# Patient Record
Sex: FEMALE | Race: WHITE | NOT HISPANIC OR LATINO | Employment: OTHER | URBAN - NONMETROPOLITAN AREA
[De-identification: names, ages, dates, MRNs, and addresses within clinical notes are randomized per-mention and may not be internally consistent; named-entity substitution may affect disease eponyms.]

---

## 2020-01-01 ENCOUNTER — APPOINTMENT (OUTPATIENT)
Dept: GENERAL RADIOLOGY | Facility: HOSPITAL | Age: 85
End: 2020-01-01

## 2020-01-01 ENCOUNTER — LAB REQUISITION (OUTPATIENT)
Dept: LAB | Facility: HOSPITAL | Age: 85
End: 2020-01-01

## 2020-01-01 ENCOUNTER — OUTSIDE FACILITY SERVICE (OUTPATIENT)
Dept: CARDIOLOGY | Facility: CLINIC | Age: 85
End: 2020-01-01

## 2020-01-01 ENCOUNTER — HOSPITAL ENCOUNTER (INPATIENT)
Facility: HOSPITAL | Age: 85
LOS: 2 days | End: 2020-11-16
Attending: EMERGENCY MEDICINE | Admitting: INTERNAL MEDICINE

## 2020-01-01 VITALS
HEART RATE: 137 BPM | OXYGEN SATURATION: 87 % | BODY MASS INDEX: 29.87 KG/M2 | DIASTOLIC BLOOD PRESSURE: 60 MMHG | HEIGHT: 65 IN | TEMPERATURE: 98.4 F | SYSTOLIC BLOOD PRESSURE: 99 MMHG | WEIGHT: 179.3 LBS | RESPIRATION RATE: 10 BRPM

## 2020-01-01 DIAGNOSIS — Z00.00 ROUTINE GENERAL MEDICAL EXAMINATION AT A HEALTH CARE FACILITY: ICD-10-CM

## 2020-01-01 DIAGNOSIS — U07.1 PNEUMONIA DUE TO COVID-19 VIRUS: Primary | ICD-10-CM

## 2020-01-01 DIAGNOSIS — N39.0 ACUTE UTI (URINARY TRACT INFECTION): ICD-10-CM

## 2020-01-01 DIAGNOSIS — I48.91 ATRIAL FIBRILLATION WITH RVR (HCC): ICD-10-CM

## 2020-01-01 DIAGNOSIS — J12.82 PNEUMONIA DUE TO COVID-19 VIRUS: Primary | ICD-10-CM

## 2020-01-01 DIAGNOSIS — R09.02 HYPOXIC: ICD-10-CM

## 2020-01-01 LAB
ALBUMIN SERPL-MCNC: 3 G/DL (ref 3.5–5.2)
ALBUMIN SERPL-MCNC: 3.3 G/DL (ref 3.5–5.2)
ALBUMIN SERPL-MCNC: 3.3 G/DL (ref 3.5–5.2)
ALBUMIN/GLOB SERPL: 0.9 G/DL
ALBUMIN/GLOB SERPL: 1.2 G/DL
ALBUMIN/GLOB SERPL: 1.4 G/DL
ALP SERPL-CCNC: 162 U/L (ref 39–117)
ALP SERPL-CCNC: 179 U/L (ref 39–117)
ALP SERPL-CCNC: 222 U/L (ref 39–117)
ALT SERPL W P-5'-P-CCNC: 7 U/L (ref 1–33)
ALT SERPL W P-5'-P-CCNC: 8 U/L (ref 1–33)
ALT SERPL W P-5'-P-CCNC: 9 U/L (ref 1–33)
ANION GAP SERPL CALCULATED.3IONS-SCNC: 15 MMOL/L (ref 5–15)
ANION GAP SERPL CALCULATED.3IONS-SCNC: 16 MMOL/L (ref 5–15)
ANION GAP SERPL CALCULATED.3IONS-SCNC: 26 MMOL/L (ref 5–15)
ANISOCYTOSIS BLD QL: NORMAL
APTT PPP: 24.7 SECONDS (ref 24.1–35)
ARTERIAL PATENCY WRIST A: POSITIVE
AST SERPL-CCNC: 13 U/L (ref 1–32)
AST SERPL-CCNC: 17 U/L (ref 1–32)
AST SERPL-CCNC: 20 U/L (ref 1–32)
ATMOSPHERIC PRESS: 750 MMHG
BACTERIA SPEC AEROBE CULT: ABNORMAL
BACTERIA SPEC AEROBE CULT: ABNORMAL
BACTERIA UR QL AUTO: ABNORMAL /HPF
BACTERIA UR QL AUTO: ABNORMAL /HPF
BASE EXCESS BLDA CALC-SCNC: 10.5 MMOL/L (ref 0–2)
BASOPHILS # BLD AUTO: 0.01 10*3/MM3 (ref 0–0.2)
BASOPHILS # BLD AUTO: 0.03 10*3/MM3 (ref 0–0.2)
BASOPHILS # BLD AUTO: 0.05 10*3/MM3 (ref 0–0.2)
BASOPHILS NFR BLD AUTO: 0.1 % (ref 0–1.5)
BASOPHILS NFR BLD AUTO: 0.2 % (ref 0–1.5)
BASOPHILS NFR BLD AUTO: 0.6 % (ref 0–1.5)
BDY SITE: ABNORMAL
BILIRUB SERPL-MCNC: 0.3 MG/DL (ref 0–1.2)
BILIRUB SERPL-MCNC: 0.3 MG/DL (ref 0–1.2)
BILIRUB SERPL-MCNC: 0.4 MG/DL (ref 0–1.2)
BILIRUB UR QL STRIP: NEGATIVE
BILIRUB UR QL STRIP: NEGATIVE
BODY TEMPERATURE: 37 C
BUN SERPL-MCNC: 57 MG/DL (ref 8–23)
BUN SERPL-MCNC: 61 MG/DL (ref 8–23)
BUN SERPL-MCNC: 62 MG/DL (ref 8–23)
BUN/CREAT SERPL: 24.8 (ref 7–25)
BUN/CREAT SERPL: 29.2 (ref 7–25)
BUN/CREAT SERPL: 35.6 (ref 7–25)
CALCIUM SPEC-SCNC: 8.8 MG/DL (ref 8.2–9.6)
CALCIUM SPEC-SCNC: 9.1 MG/DL (ref 8.2–9.6)
CALCIUM SPEC-SCNC: 9.1 MG/DL (ref 8.2–9.6)
CHLORIDE SERPL-SCNC: 104 MMOL/L (ref 98–107)
CHLORIDE SERPL-SCNC: 86 MMOL/L (ref 98–107)
CHLORIDE SERPL-SCNC: 98 MMOL/L (ref 98–107)
CLARITY UR: ABNORMAL
CLARITY UR: ABNORMAL
CO2 SERPL-SCNC: 23 MMOL/L (ref 22–29)
CO2 SERPL-SCNC: 31 MMOL/L (ref 22–29)
CO2 SERPL-SCNC: 33 MMOL/L (ref 22–29)
COLOR UR: YELLOW
COLOR UR: YELLOW
CREAT SERPL-MCNC: 1.6 MG/DL (ref 0.57–1)
CREAT SERPL-MCNC: 2.12 MG/DL (ref 0.57–1)
CREAT SERPL-MCNC: 2.46 MG/DL (ref 0.57–1)
CRP SERPL-MCNC: 7.01 MG/DL (ref 0–0.5)
D DIMER PPP FEU-MCNC: 2.47 MG/L (FEU) (ref 0–0.5)
D-LACTATE SERPL-SCNC: 2 MMOL/L (ref 0.5–2)
D-LACTATE SERPL-SCNC: 2.1 MMOL/L (ref 0.5–2)
DEPRECATED RDW RBC AUTO: 56.5 FL (ref 37–54)
DEPRECATED RDW RBC AUTO: 57.1 FL (ref 37–54)
DEPRECATED RDW RBC AUTO: 58.4 FL (ref 37–54)
ELLIPTOCYTES BLD QL SMEAR: NORMAL
EOSINOPHIL # BLD AUTO: 0 10*3/MM3 (ref 0–0.4)
EOSINOPHIL # BLD AUTO: 0 10*3/MM3 (ref 0–0.4)
EOSINOPHIL # BLD AUTO: 0.08 10*3/MM3 (ref 0–0.4)
EOSINOPHIL NFR BLD AUTO: 0 % (ref 0.3–6.2)
EOSINOPHIL NFR BLD AUTO: 0 % (ref 0.3–6.2)
EOSINOPHIL NFR BLD AUTO: 1 % (ref 0.3–6.2)
ERYTHROCYTE [DISTWIDTH] IN BLOOD BY AUTOMATED COUNT: 22.3 % (ref 12.3–15.4)
ERYTHROCYTE [DISTWIDTH] IN BLOOD BY AUTOMATED COUNT: 22.6 % (ref 12.3–15.4)
ERYTHROCYTE [DISTWIDTH] IN BLOOD BY AUTOMATED COUNT: 22.8 % (ref 12.3–15.4)
FINE GRAN CASTS URNS QL MICRO: ABNORMAL /LPF
GFR SERPL CREATININE-BSD FRML MDRD: 18 ML/MIN/1.73
GFR SERPL CREATININE-BSD FRML MDRD: 22 ML/MIN/1.73
GFR SERPL CREATININE-BSD FRML MDRD: 30 ML/MIN/1.73
GLOBULIN UR ELPH-MCNC: 2.4 GM/DL
GLOBULIN UR ELPH-MCNC: 2.8 GM/DL
GLOBULIN UR ELPH-MCNC: 3.2 GM/DL
GLUCOSE SERPL-MCNC: 1043 MG/DL (ref 65–99)
GLUCOSE SERPL-MCNC: 424 MG/DL (ref 65–99)
GLUCOSE SERPL-MCNC: 447 MG/DL (ref 65–99)
GLUCOSE UR STRIP-MCNC: ABNORMAL MG/DL
GLUCOSE UR STRIP-MCNC: ABNORMAL MG/DL
HCO3 BLDA-SCNC: 35.7 MMOL/L (ref 20–26)
HCT VFR BLD AUTO: 42.7 % (ref 34–46.6)
HCT VFR BLD AUTO: 42.8 % (ref 34–46.6)
HCT VFR BLD AUTO: 45.2 % (ref 34–46.6)
HGB BLD-MCNC: 12.5 G/DL (ref 12–15.9)
HGB BLD-MCNC: 12.8 G/DL (ref 12–15.9)
HGB BLD-MCNC: 13.1 G/DL (ref 12–15.9)
HGB UR QL STRIP.AUTO: ABNORMAL
HGB UR QL STRIP.AUTO: ABNORMAL
HYALINE CASTS UR QL AUTO: ABNORMAL /LPF
HYALINE CASTS UR QL AUTO: ABNORMAL /LPF
INR PPP: 1.18 (ref 0.91–1.09)
KETONES UR QL STRIP: ABNORMAL
KETONES UR QL STRIP: NEGATIVE
LACTATE HOLD SPECIMEN: NORMAL
LEUKOCYTE ESTERASE UR QL STRIP.AUTO: ABNORMAL
LEUKOCYTE ESTERASE UR QL STRIP.AUTO: ABNORMAL
LYMPHOCYTES # BLD AUTO: 0.49 10*3/MM3 (ref 0.7–3.1)
LYMPHOCYTES # BLD AUTO: 1 10*3/MM3 (ref 0.7–3.1)
LYMPHOCYTES # BLD AUTO: 1.39 10*3/MM3 (ref 0.7–3.1)
LYMPHOCYTES NFR BLD AUTO: 16.7 % (ref 19.6–45.3)
LYMPHOCYTES NFR BLD AUTO: 4.5 % (ref 19.6–45.3)
LYMPHOCYTES NFR BLD AUTO: 8.1 % (ref 19.6–45.3)
Lab: ABNORMAL
MCH RBC QN AUTO: 22 PG (ref 26.6–33)
MCH RBC QN AUTO: 22.2 PG (ref 26.6–33)
MCH RBC QN AUTO: 22.4 PG (ref 26.6–33)
MCHC RBC AUTO-ENTMCNC: 29 G/DL (ref 31.5–35.7)
MCHC RBC AUTO-ENTMCNC: 29.2 G/DL (ref 31.5–35.7)
MCHC RBC AUTO-ENTMCNC: 30 G/DL (ref 31.5–35.7)
MCV RBC AUTO: 74.7 FL (ref 79–97)
MCV RBC AUTO: 75.8 FL (ref 79–97)
MCV RBC AUTO: 76 FL (ref 79–97)
MICROCYTES BLD QL: NORMAL
MODALITY: ABNORMAL
MONOCYTES # BLD AUTO: 0.42 10*3/MM3 (ref 0.1–0.9)
MONOCYTES # BLD AUTO: 0.58 10*3/MM3 (ref 0.1–0.9)
MONOCYTES # BLD AUTO: 1.05 10*3/MM3 (ref 0.1–0.9)
MONOCYTES NFR BLD AUTO: 12.6 % (ref 5–12)
MONOCYTES NFR BLD AUTO: 3.9 % (ref 5–12)
MONOCYTES NFR BLD AUTO: 4.7 % (ref 5–12)
NEUTROPHILS NFR BLD AUTO: 10.63 10*3/MM3 (ref 1.7–7)
NEUTROPHILS NFR BLD AUTO: 5.69 10*3/MM3 (ref 1.7–7)
NEUTROPHILS NFR BLD AUTO: 68.5 % (ref 42.7–76)
NEUTROPHILS NFR BLD AUTO: 86.8 % (ref 42.7–76)
NEUTROPHILS NFR BLD AUTO: 9.92 10*3/MM3 (ref 1.7–7)
NEUTROPHILS NFR BLD AUTO: 91.2 % (ref 42.7–76)
NITRITE UR QL STRIP: NEGATIVE
NITRITE UR QL STRIP: NEGATIVE
NT-PROBNP SERPL-MCNC: 4103 PG/ML (ref 0–1800)
PCO2 BLDA: 48.4 MM HG (ref 35–45)
PCO2 TEMP ADJ BLD: 48.4 MM HG (ref 35–45)
PH BLDA: 7.47 PH UNITS (ref 7.35–7.45)
PH UR STRIP.AUTO: 6 [PH] (ref 5–8)
PH UR STRIP.AUTO: <=5 [PH] (ref 5–8)
PH, TEMP CORRECTED: 7.47 PH UNITS (ref 7.35–7.45)
PLATELET # BLD AUTO: 291 10*3/MM3 (ref 140–450)
PLATELET # BLD AUTO: 353 10*3/MM3 (ref 140–450)
PLATELET # BLD AUTO: 385 10*3/MM3 (ref 140–450)
PMV BLD AUTO: 11.2 FL (ref 6–12)
PMV BLD AUTO: 11.6 FL (ref 6–12)
PO2 BLDA: 61.3 MM HG (ref 83–108)
PO2 TEMP ADJ BLD: 61.3 MM HG (ref 83–108)
POIKILOCYTOSIS BLD QL SMEAR: NORMAL
POTASSIUM SERPL-SCNC: 2.9 MMOL/L (ref 3.5–5.2)
POTASSIUM SERPL-SCNC: 3.1 MMOL/L (ref 3.5–5.2)
POTASSIUM SERPL-SCNC: 3.9 MMOL/L (ref 3.5–5.2)
PROCALCITONIN SERPL-MCNC: 0.81 NG/ML (ref 0–0.25)
PROT SERPL-MCNC: 5.7 G/DL (ref 6–8.5)
PROT SERPL-MCNC: 6.1 G/DL (ref 6–8.5)
PROT SERPL-MCNC: 6.2 G/DL (ref 6–8.5)
PROT UR QL STRIP: ABNORMAL
PROT UR QL STRIP: ABNORMAL
PROTHROMBIN TIME: 14.6 SECONDS (ref 11.9–14.6)
QT INTERVAL: 322 MS
QTC INTERVAL: 467 MS
RBC # BLD AUTO: 5.63 10*6/MM3 (ref 3.77–5.28)
RBC # BLD AUTO: 5.72 10*6/MM3 (ref 3.77–5.28)
RBC # BLD AUTO: 5.96 10*6/MM3 (ref 3.77–5.28)
RBC # UR: ABNORMAL /HPF
RBC # UR: ABNORMAL /HPF
REF LAB TEST METHOD: ABNORMAL
REF LAB TEST METHOD: ABNORMAL
SAO2 % BLDCOA: 92.4 % (ref 94–99)
SARS-COV-2 RNA PNL SPEC NAA+PROBE: NOT DETECTED
SMALL PLATELETS BLD QL SMEAR: ADEQUATE
SODIUM SERPL-SCNC: 135 MMOL/L (ref 136–145)
SODIUM SERPL-SCNC: 147 MMOL/L (ref 136–145)
SODIUM SERPL-SCNC: 150 MMOL/L (ref 136–145)
SP GR UR STRIP: 1.02 (ref 1–1.03)
SP GR UR STRIP: 1.02 (ref 1–1.03)
SQUAMOUS #/AREA URNS HPF: ABNORMAL /HPF
SQUAMOUS #/AREA URNS HPF: ABNORMAL /HPF
TROPONIN T SERPL-MCNC: 0.04 NG/ML (ref 0–0.03)
UROBILINOGEN UR QL STRIP: ABNORMAL
UROBILINOGEN UR QL STRIP: ABNORMAL
VENTILATOR MODE: ABNORMAL
WBC # BLD AUTO: 10.87 10*3/MM3 (ref 3.4–10.8)
WBC # BLD AUTO: 12.27 10*3/MM3 (ref 3.4–10.8)
WBC # BLD AUTO: 8.31 10*3/MM3 (ref 3.4–10.8)
WBC UR QL AUTO: ABNORMAL /HPF
WBC UR QL AUTO: ABNORMAL /HPF
YEAST URNS QL MICRO: ABNORMAL /HPF
YEAST URNS QL MICRO: ABNORMAL /HPF

## 2020-01-01 PROCEDURE — 25010000002 LORAZEPAM PER 2 MG: Performed by: FAMILY MEDICINE

## 2020-01-01 PROCEDURE — 83605 ASSAY OF LACTIC ACID: CPT | Performed by: EMERGENCY MEDICINE

## 2020-01-01 PROCEDURE — 85025 COMPLETE CBC W/AUTO DIFF WBC: CPT

## 2020-01-01 PROCEDURE — 99223 1ST HOSP IP/OBS HIGH 75: CPT | Performed by: CLINICAL NURSE SPECIALIST

## 2020-01-01 PROCEDURE — 36600 WITHDRAWAL OF ARTERIAL BLOOD: CPT

## 2020-01-01 PROCEDURE — 93010 ELECTROCARDIOGRAM REPORT: CPT | Performed by: INTERNAL MEDICINE

## 2020-01-01 PROCEDURE — 25010000002 MORPHINE (PF) 10 MG/ML SOLUTION: Performed by: FAMILY MEDICINE

## 2020-01-01 PROCEDURE — 85007 BL SMEAR W/DIFF WBC COUNT: CPT | Performed by: EMERGENCY MEDICINE

## 2020-01-01 PROCEDURE — 99285 EMERGENCY DEPT VISIT HI MDM: CPT

## 2020-01-01 PROCEDURE — 80053 COMPREHEN METABOLIC PANEL: CPT | Performed by: EMERGENCY MEDICINE

## 2020-01-01 PROCEDURE — 82803 BLOOD GASES ANY COMBINATION: CPT

## 2020-01-01 PROCEDURE — 80053 COMPREHEN METABOLIC PANEL: CPT

## 2020-01-01 PROCEDURE — 81001 URINALYSIS AUTO W/SCOPE: CPT

## 2020-01-01 PROCEDURE — 84145 PROCALCITONIN (PCT): CPT | Performed by: EMERGENCY MEDICINE

## 2020-01-01 PROCEDURE — 87086 URINE CULTURE/COLONY COUNT: CPT

## 2020-01-01 PROCEDURE — 84484 ASSAY OF TROPONIN QUANT: CPT | Performed by: EMERGENCY MEDICINE

## 2020-01-01 PROCEDURE — 87086 URINE CULTURE/COLONY COUNT: CPT | Performed by: EMERGENCY MEDICINE

## 2020-01-01 PROCEDURE — 85025 COMPLETE CBC W/AUTO DIFF WBC: CPT | Performed by: EMERGENCY MEDICINE

## 2020-01-01 PROCEDURE — 85610 PROTHROMBIN TIME: CPT | Performed by: EMERGENCY MEDICINE

## 2020-01-01 PROCEDURE — 86140 C-REACTIVE PROTEIN: CPT | Performed by: EMERGENCY MEDICINE

## 2020-01-01 PROCEDURE — 93005 ELECTROCARDIOGRAM TRACING: CPT | Performed by: EMERGENCY MEDICINE

## 2020-01-01 PROCEDURE — 25010000002 CEFTRIAXONE PER 250 MG: Performed by: EMERGENCY MEDICINE

## 2020-01-01 PROCEDURE — 25010000002 LORAZEPAM PER 2 MG: Performed by: CLINICAL NURSE SPECIALIST

## 2020-01-01 PROCEDURE — 25010000002 DEXAMETHASONE SODIUM PHOSPHATE 10 MG/ML SOLUTION: Performed by: EMERGENCY MEDICINE

## 2020-01-01 PROCEDURE — 81001 URINALYSIS AUTO W/SCOPE: CPT | Performed by: EMERGENCY MEDICINE

## 2020-01-01 PROCEDURE — P9612 CATHETERIZE FOR URINE SPEC: HCPCS

## 2020-01-01 PROCEDURE — 87635 SARS-COV-2 COVID-19 AMP PRB: CPT | Performed by: EMERGENCY MEDICINE

## 2020-01-01 PROCEDURE — 85730 THROMBOPLASTIN TIME PARTIAL: CPT | Performed by: EMERGENCY MEDICINE

## 2020-01-01 PROCEDURE — 83880 ASSAY OF NATRIURETIC PEPTIDE: CPT | Performed by: EMERGENCY MEDICINE

## 2020-01-01 PROCEDURE — 87040 BLOOD CULTURE FOR BACTERIA: CPT | Performed by: EMERGENCY MEDICINE

## 2020-01-01 PROCEDURE — 71045 X-RAY EXAM CHEST 1 VIEW: CPT

## 2020-01-01 PROCEDURE — 85379 FIBRIN DEGRADATION QUANT: CPT | Performed by: EMERGENCY MEDICINE

## 2020-01-01 RX ORDER — LORAZEPAM 2 MG/ML
2 CONCENTRATE ORAL
Status: DISCONTINUED | OUTPATIENT
Start: 2020-01-01 | End: 2020-11-17 | Stop reason: HOSPADM

## 2020-01-01 RX ORDER — DEXAMETHASONE SODIUM PHOSPHATE 10 MG/ML
10 INJECTION, SOLUTION INTRAMUSCULAR; INTRAVENOUS ONCE
Status: COMPLETED | OUTPATIENT
Start: 2020-01-01 | End: 2020-01-01

## 2020-01-01 RX ORDER — ACETAMINOPHEN 160 MG/5ML
650 SOLUTION ORAL EVERY 4 HOURS PRN
Status: DISCONTINUED | OUTPATIENT
Start: 2020-01-01 | End: 2020-11-17 | Stop reason: HOSPADM

## 2020-01-01 RX ORDER — LORAZEPAM 2 MG/ML
1 INJECTION INTRAMUSCULAR
Status: DISCONTINUED | OUTPATIENT
Start: 2020-01-01 | End: 2020-11-17 | Stop reason: HOSPADM

## 2020-01-01 RX ORDER — MORPHINE SULFATE 1 MG/ML
2-30 INJECTION, SOLUTION INTRAVENOUS
Status: DISCONTINUED | OUTPATIENT
Start: 2020-01-01 | End: 2020-11-17 | Stop reason: HOSPADM

## 2020-01-01 RX ORDER — LORAZEPAM 1 MG/1
2 TABLET ORAL
Status: DISCONTINUED | OUTPATIENT
Start: 2020-01-01 | End: 2020-11-17 | Stop reason: HOSPADM

## 2020-01-01 RX ORDER — DIPHENOXYLATE HYDROCHLORIDE AND ATROPINE SULFATE 2.5; .025 MG/1; MG/1
1 TABLET ORAL
Status: DISCONTINUED | OUTPATIENT
Start: 2020-01-01 | End: 2020-11-17 | Stop reason: HOSPADM

## 2020-01-01 RX ORDER — LORAZEPAM 1 MG/1
1 TABLET ORAL
Status: DISCONTINUED | OUTPATIENT
Start: 2020-01-01 | End: 2020-11-17 | Stop reason: HOSPADM

## 2020-01-01 RX ORDER — LORAZEPAM 2 MG/ML
1 CONCENTRATE ORAL
Status: DISCONTINUED | OUTPATIENT
Start: 2020-01-01 | End: 2020-11-17 | Stop reason: HOSPADM

## 2020-01-01 RX ORDER — ACETAMINOPHEN 650 MG/1
650 SUPPOSITORY RECTAL EVERY 4 HOURS PRN
Status: DISCONTINUED | OUTPATIENT
Start: 2020-01-01 | End: 2020-11-17 | Stop reason: HOSPADM

## 2020-01-01 RX ORDER — MORPHINE SULFATE 1 MG/ML
2 INJECTION, SOLUTION INTRAVENOUS CONTINUOUS
Status: DISCONTINUED | OUTPATIENT
Start: 2020-01-01 | End: 2020-01-01

## 2020-01-01 RX ORDER — SCOLOPAMINE TRANSDERMAL SYSTEM 1 MG/1
1 PATCH, EXTENDED RELEASE TRANSDERMAL
Status: DISCONTINUED | OUTPATIENT
Start: 2020-01-01 | End: 2020-11-17 | Stop reason: HOSPADM

## 2020-01-01 RX ORDER — ACETAMINOPHEN 325 MG/1
650 TABLET ORAL EVERY 4 HOURS PRN
Status: DISCONTINUED | OUTPATIENT
Start: 2020-01-01 | End: 2020-11-17 | Stop reason: HOSPADM

## 2020-01-01 RX ORDER — LORAZEPAM 2 MG/ML
0.5 CONCENTRATE ORAL
Status: DISCONTINUED | OUTPATIENT
Start: 2020-01-01 | End: 2020-11-17 | Stop reason: HOSPADM

## 2020-01-01 RX ORDER — SODIUM CHLORIDE 0.9 % (FLUSH) 0.9 %
10 SYRINGE (ML) INJECTION AS NEEDED
Status: DISCONTINUED | OUTPATIENT
Start: 2020-01-01 | End: 2020-11-17 | Stop reason: HOSPADM

## 2020-01-01 RX ORDER — LORAZEPAM 0.5 MG/1
0.5 TABLET ORAL
Status: DISCONTINUED | OUTPATIENT
Start: 2020-01-01 | End: 2020-11-17 | Stop reason: HOSPADM

## 2020-01-01 RX ORDER — LORAZEPAM 2 MG/ML
2 INJECTION INTRAMUSCULAR
Status: DISCONTINUED | OUTPATIENT
Start: 2020-01-01 | End: 2020-11-17 | Stop reason: HOSPADM

## 2020-01-01 RX ORDER — LORAZEPAM 2 MG/ML
0.5 INJECTION INTRAMUSCULAR
Status: DISCONTINUED | OUTPATIENT
Start: 2020-01-01 | End: 2020-11-17 | Stop reason: HOSPADM

## 2020-01-01 RX ORDER — LORAZEPAM 2 MG/ML
0.25 INJECTION INTRAMUSCULAR EVERY 6 HOURS
Status: DISCONTINUED | OUTPATIENT
Start: 2020-01-01 | End: 2020-11-17 | Stop reason: HOSPADM

## 2020-01-01 RX ADMIN — DEXAMETHASONE SODIUM PHOSPHATE 10 MG: 10 INJECTION, SOLUTION INTRAMUSCULAR; INTRAVENOUS at 11:49

## 2020-01-01 RX ADMIN — CEFTRIAXONE SODIUM 1 G: 1 INJECTION, POWDER, FOR SOLUTION INTRAMUSCULAR; INTRAVENOUS at 11:49

## 2020-01-01 RX ADMIN — LORAZEPAM 0.25 MG: 2 INJECTION INTRAMUSCULAR; INTRAVENOUS at 15:34

## 2020-01-01 RX ADMIN — MORPHINE SULFATE 4 MG/HR: 10 INJECTION, SOLUTION INTRAMUSCULAR; INTRAVENOUS at 00:13

## 2020-01-01 RX ADMIN — MORPHINE SULFATE 4 MG/HR: 10 INJECTION, SOLUTION INTRAMUSCULAR; INTRAVENOUS at 10:58

## 2020-01-01 RX ADMIN — MORPHINE SULFATE 4 MG/HR: 10 INJECTION, SOLUTION INTRAMUSCULAR; INTRAVENOUS at 17:07

## 2020-01-01 RX ADMIN — SCOPALAMINE 1 PATCH: 1 PATCH, EXTENDED RELEASE TRANSDERMAL at 15:34

## 2020-01-01 RX ADMIN — DILTIAZEM HYDROCHLORIDE 5 MG/HR: 5 INJECTION INTRAVENOUS at 12:39

## 2020-01-01 RX ADMIN — LORAZEPAM 0.5 MG: 2 INJECTION INTRAMUSCULAR; INTRAVENOUS at 10:34

## 2020-01-01 RX ADMIN — MORPHINE SULFATE 2 MG/HR: 10 INJECTION, SOLUTION INTRAMUSCULAR; INTRAVENOUS at 03:58

## 2020-11-14 PROBLEM — F02.80 LATE ONSET ALZHEIMER'S DISEASE WITHOUT BEHAVIORAL DISTURBANCE (HCC): Status: ACTIVE | Noted: 2020-01-01

## 2020-11-14 PROBLEM — J12.82 PNEUMONIA DUE TO COVID-19 VIRUS: Status: ACTIVE | Noted: 2020-01-01

## 2020-11-14 PROBLEM — U07.1 PNEUMONIA DUE TO COVID-19 VIRUS: Status: ACTIVE | Noted: 2020-01-01

## 2020-11-14 PROBLEM — Z51.5 COMFORT MEASURES ONLY STATUS: Status: ACTIVE | Noted: 2020-01-01

## 2020-11-14 PROBLEM — I48.0 PAROXYSMAL ATRIAL FIBRILLATION WITH RAPID VENTRICULAR RESPONSE (HCC): Status: ACTIVE | Noted: 2020-01-01

## 2020-11-14 PROBLEM — G30.1 LATE ONSET ALZHEIMER'S DISEASE WITHOUT BEHAVIORAL DISTURBANCE (HCC): Status: ACTIVE | Noted: 2020-01-01

## 2020-11-14 NOTE — H&P
[facility] Medicine Services  HISTORY AND PHYSICAL    Date of Admission: 11/14/2020  Primary Care Physician: Raymond Juárez MD    Subjective     Chief Complaint:   Decreased level of consciousness, positive Covid study at [place]    History of Present Illness    This 89-year-old white female is admitted after being transferred from [place] with confusion, hypoxia, altered level of consciousness.  The patient was found to be Covid positive at [place].  She was evaluated in the emergency department and received IV antibiotics and IV steroids as well as supplemental oxygen and Cardizem for A. fib with RVR.  Discussion was held with the patient's daughter about the patient's overall condition.  The patient was DNR/DNI at the nursing facility and the patient's daughter stated that she wanted her mother to be comfortable.  After further discussion patient's daughter felt that continuation of aggressive treatment would be counterproductive at this point.  She would like to transition to comfort measures care.    Work-up in the ED shows urinalysis with 3-5 RBCs and 13-20 WBCs per high-powered field with 3+ budding yeast.  Blood gases show respiratory alkalosis with PCO2 48.5 and PO2 61.3.  CMP shows blood sugar of 424, creatinine 1.6, sodium 150, potassium 3.1.  proBNP elevated at 4103, D-dimer 2.47, troponin 0 0.045, lactate 2.1.  Chest x-ray showed a dense consolidation in the left lateral lung base concerning for left-sided basilar pneumonia.    Review of Systems   Unable to obtain review of systems secondary to patient's illness.  She is noncommunicative at present.  Otherwise complete ROS reviewed and negative except as mentioned in the HPI.      Past Medical History:     Past Medical History:   Diagnosis Date   • Atrial fibrillation (CMS/HCC)    • CHF (congestive heart failure) (CMS/HCC)    • Constipation    • CVA (cerebral vascular accident) (CMS/HCC)    • GERD  "(gastroesophageal reflux disease)    • Hypertension    • Hypothyroidism    • Type 2 diabetes mellitus (CMS/HCC)        Past Surgical History:  History reviewed. No pertinent surgical history.    Family History:   family history is not on file.    Social History:    reports previous alcohol use.    Medications:  Prior to Admission medications    Not on File       Allergies:  Allergies   Allergen Reactions   • Ciprofloxacin Unknown - High Severity   • Pregabalin Unknown - Low Severity   • Vancomycin Unknown - High Severity       Objective     Vital Signs:   /83   Pulse (!) 123   Temp 98.1 °F (36.7 °C) (Axillary)   Resp 23   Ht 165.1 cm (65\")   Wt 81.3 kg (179 lb 4.8 oz)   SpO2 98%   BMI 29.84 kg/m²     Physical Exam  Constitutional:       Appearance: Normal appearance. She is normal weight.   HENT:      Head: Normocephalic and atraumatic.      Right Ear: External ear normal.      Left Ear: External ear normal.      Nose: Nose normal.      Mouth/Throat:      Mouth: Mucous membranes are dry.      Pharynx: Oropharynx is clear.   Eyes:      General: No scleral icterus.     Conjunctiva/sclera: Conjunctivae normal.      Pupils: Pupils are equal, round, and reactive to light.   Neck:      Musculoskeletal: Normal range of motion and neck supple.   Cardiovascular:      Rate and Rhythm: Regular rhythm. Tachycardia present.      Heart sounds: Normal heart sounds.   Pulmonary:      Effort: Respiratory distress present. No accessory muscle usage.      Breath sounds: Examination of the left-lower field reveals rales. Rales present.   Abdominal:      General: Abdomen is flat.      Palpations: There is no mass.      Tenderness: There is abdominal tenderness.   Musculoskeletal:         General: No swelling.      Right lower leg: No edema.      Left lower leg: No edema.   Skin:     General: Skin is warm and dry.   Neurological:      Mental Status: She is disoriented and unresponsive.      Comments: Unable to complete " neurologic exam as the patient is nonresponsive and unable to cooperate.  She moans when I touch her chest, abdomen and lower extremities.   Psychiatric:         Cognition and Memory: Cognition is impaired.         Results Reviewed:  Lab Results (last 24 hours)     Procedure Component Value Units Date/Time    Lactic Acid, Reflex Timer (This will reflex a repeat order 3-3:15 hours after ordered.) [[id number]] Collected: 11/14/20 1149    Specimen: Blood Updated: 11/14/20 1545     Hold Tube Hold for add-ons.     Comment: Auto resulted.       COVID PRE-OP / PRE-PROCEDURE SCREENING ORDER (NO ISOLATION) - Swab, Nasal Cavity [[id number]]  (Normal) Collected: 11/14/20 1150    Specimen: Swab from Nasal Cavity Updated: 11/14/20 1313    Narrative:      The following orders were created for panel order COVID PRE-OP / PRE-PROCEDURE SCREENING ORDER (NO ISOLATION) - Swab, Nasal Cavity.  Procedure                               Abnormality         Status                     ---------                               -----------         ------                     COVID-19,[facility]...[[id number]]  Normal              Final result                 Please view results for these tests on the individual orders.    COVID-19,[facility] IN-HOUSE,Nasal Swab No Transport Media 3-4 HR TAT - Swab, Nasal Cavity [[id number]]  (Normal) Collected: 11/14/20 1150    Specimen: Swab from Nasal Cavity Updated: 11/14/20 1313     COVID19 Not Detected    Narrative:      Fact sheet for providers: https://www.fda.gov/media/660541/download     Fact sheet for patients: https://www.fda.gov/media/334449/download  Fact sheet for providers: https://www.fda.gov/media/957573/download     Fact sheet for patients: https://www.fda.gov/media/515863/download    Urinalysis, Microscopic Only - Urine, Catheter [[id number]]  (Abnormal) Collected: 11/14/20 1239    Specimen: Urine, Catheter Updated: 11/14/20 1308     RBC, UA 3-5 /HPF      WBC, UA 13-20 /HPF      Bacteria, UA Trace  /HPF      Squamous Epithelial Cells, UA 0-2 /HPF      Yeast, UA Large/3+ Budding Yeast /HPF      Hyaline Casts, UA None Seen /LPF      Methodology Manual Light Microscopy    Urine Culture - Urine, Urine, Catheter [[id number]] Collected: 11/14/20 1239    Specimen: Urine, Catheter Updated: 11/14/20 1308    Urinalysis With Culture If Indicated - Urine, Catheter [[id number]]  (Abnormal) Collected: 11/14/20 1239    Specimen: Urine, Catheter Updated: 11/14/20 1303     Color, UA Yellow     Appearance, UA Cloudy     pH, UA 6.0     Specific Gravity, UA 1.020     Glucose, UA >=1000 mg/dL (3+)     Ketones, UA 15 mg/dL (1+)     Bilirubin, UA Negative     Blood, UA Small (1+)     Protein,  mg/dL (2+)     Leuk Esterase, UA Small (1+)     Nitrite, UA Negative     Urobilinogen, UA 0.2 E.U./dL    Comprehensive Metabolic Panel [[id number]]  (Abnormal) Collected: 11/14/20 1149    Specimen: Blood Updated: 11/14/20 1236     Glucose 424 mg/dL      BUN 57 mg/dL      Creatinine 1.60 mg/dL      Sodium 150 mmol/L      Potassium 3.1 mmol/L      Chloride 104 mmol/L      CO2 31.0 mmol/L      Calcium 9.1 mg/dL      Total Protein 6.2 g/dL      Albumin 3.00 g/dL      ALT (SGPT) 7 U/L      AST (SGOT) 17 U/L      Alkaline Phosphatase 162 U/L      Total Bilirubin 0.3 mg/dL      eGFR Non African Amer 30 mL/min/1.73      Globulin 3.2 gm/dL      A/G Ratio 0.9 g/dL      BUN/Creatinine Ratio 35.6     Anion Gap 15.0 mmol/L     Narrative:      GFR Normal >60  Chronic Kidney Disease <60  Kidney Failure <15      Troponin [[id number]]  (Abnormal) Collected: 11/14/20 1149    Specimen: Blood Updated: 11/14/20 1235     Troponin T 0.045 ng/mL     Narrative:      Troponin T Reference Range:  <= 0.03 ng/mL-   Negative for AMI  >0.03 ng/mL-     Abnormal for myocardial necrosis.  Clinicians would have to utilize clinical acumen, EKG, Troponin and serial changes to determine if it is an Acute Myocardial Infarction or myocardial injury due to an underlying chronic  "condition.       Results may be falsely decreased if patient taking Biotin.      Lactic Acid, Plasma [[id number]]  (Abnormal) Collected: 11/14/20 1149    Specimen: Blood Updated: 11/14/20 1235     Lactate 2.1 mmol/L     C-reactive Protein [[id number]]  (Abnormal) Collected: 11/14/20 1149    Specimen: Blood Updated: 11/14/20 1230     C-Reactive Protein 7.01 mg/dL     CBC & Differential [[id number]]  (Abnormal) Collected: 11/14/20 1149    Specimen: Blood Updated: 11/14/20 1229    Narrative:      The following orders were created for panel order CBC & Differential.  Procedure                               Abnormality         Status                     ---------                               -----------         ------                     CBC Auto Differential[[id number]]        Abnormal            Final result                 Please view results for these tests on the individual orders.    Procalcitonin [[id number]]  (Abnormal) Collected: 11/14/20 1149    Specimen: Blood Updated: 11/14/20 1229     Procalcitonin 0.81 ng/mL     Narrative:      As a Marker for Sepsis (Non-Neonates):   1. <0.5 ng/mL represents a low risk of severe sepsis and/or septic shock.  1. >2 ng/mL represents a high risk of severe sepsis and/or septic shock.    As a Marker for Lower Respiratory Tract Infections that require antibiotic therapy:  PCT on Admission     Antibiotic Therapy             6-12 Hrs later  > 0.5                Strongly Recommended            >0.25 - <0.5         Recommended  0.1 - 0.25           Discouraged                   Remeasure/reassess PCT  <0.1                 Strongly Discouraged          Remeasure/reassess PCT      As 28 day mortality risk marker: \"Change in Procalcitonin Result\" (> 80 % or <=80 %) if Day 0 (or Day 1) and Day 4 values are available. Refer to http://www.[employer]-pct-calculator.com/   Change in PCT <=80 %   A decrease of PCT levels below or equal to 80 % defines a positive change in PCT test result representing " a higher risk for 28-day all-cause mortality of patients diagnosed with severe sepsis or septic shock.  Change in PCT > 80 %   A decrease of PCT levels of more than 80 % defines a negative change in PCT result representing a lower risk for 28-day all-cause mortality of patients diagnosed with severe sepsis or septic shock.                Results may be falsely decreased if patient taking Biotin.     CBC Auto Differential [[id number]]  (Abnormal) Collected: 11/14/20 1149    Specimen: Blood Updated: 11/14/20 1229     WBC 8.31 10*3/mm3      RBC 5.72 10*6/mm3      Hemoglobin 12.8 g/dL      Hematocrit 42.7 %      MCV 74.7 fL      MCH 22.4 pg      MCHC 30.0 g/dL      RDW 22.8 %      RDW-SD 56.5 fl      MPV 11.2 fL      Platelets 291 10*3/mm3      Neutrophil % 68.5 %      Lymphocyte % 16.7 %      Monocyte % 12.6 %      Eosinophil % 1.0 %      Basophil % 0.6 %      Neutrophils, Absolute 5.69 10*3/mm3      Lymphocytes, Absolute 1.39 10*3/mm3      Monocytes, Absolute 1.05 10*3/mm3      Eosinophils, Absolute 0.08 10*3/mm3      Basophils, Absolute 0.05 10*3/mm3     Scan Slide [[id number]] Collected: 11/14/20 1149    Specimen: Blood Updated: 11/14/20 1229     Anisocytosis Mod/2+     Elliptocytes Slight/1+     Microcytes Slight/1+     Poikilocytes Slight/1+     Platelet Estimate Adequate    BNP [[id number]]  (Abnormal) Collected: 11/14/20 1149    Specimen: Blood Updated: 11/14/20 1222     proBNP 4,103.0 pg/mL     Narrative:      Among patients with dyspnea, NT-proBNP is highly sensitive for the detection of acute congestive heart failure. In addition NT-proBNP of <300 pg/ml effectively rules out acute congestive heart failure with 99% negative predictive value.    Results may be falsely decreased if patient taking Biotin.      Protime-INR [[id number]]  (Abnormal) Collected: 11/14/20 1149    Specimen: Blood Updated: 11/14/20 1215     Protime 14.6 Seconds      INR 1.18    aPTT [[id number]]  (Normal) Collected: 11/14/20 1149    Specimen:  Blood Updated: 11/14/20 1215     PTT 24.7 seconds     D-dimer, Quantitative [[id number]]  (Abnormal) Collected: 11/14/20 1149    Specimen: Blood Updated: 11/14/20 1215     D-Dimer, Quantitative 2.47 mg/L (FEU)     Narrative:      Reference Range is 0-0.50 mg/L FEU. However, results <0.50 mg/L FEU tends to rule out DVT or PE. Results >0.50 mg/L FEU are not useful in predicting absence or presence of DVT or PE.      Blood Culture - Blood, Arm, Left [[id number]] Collected: 11/14/20 1149    Specimen: Blood from Arm, Left Updated: 11/14/20 1203    Blood Culture - Blood, Arm, Right [[id number]] Collected: 11/14/20 1142    Specimen: Blood from Arm, Right Updated: 11/14/20 1203    Blood Gas, Arterial - [[id number]]  (Abnormal) Collected: 11/14/20 1152    Specimen: Arterial Blood Updated: 11/14/20 1156     Site Right Radial     Ren's Test Positive     pH, Arterial 7.475 pH units      Comment: 83 Value above reference range        pCO2, Arterial 48.4 mm Hg      Comment: 83 Value above reference range        pO2, Arterial 61.3 mm Hg      Comment: 84 Value below reference range        HCO3, Arterial 35.7 mmol/L      Comment: 83 Value above reference range        Base Excess, Arterial 10.5 mmol/L      Comment: 83 Value above reference range        O2 Saturation, Arterial 92.4 %      Comment: 84 Value below reference range        Temperature 37.0 C      Barometric Pressure for Blood Gas 750 mmHg      Modality Room Air     Ventilator Mode NA     Collected by [id number]     Comment: Meter: [id number]     :  [id number]        pCO2, Temperature Corrected 48.4 mm Hg      pH, Temp Corrected 7.475 pH Units      pO2, Temperature Corrected 61.3 mm Hg           Xr Chest 1 View    Result Date: 11/14/2020  Narrative: Frontal upright radiograph of the chest 11/14/2020 11:35 AM CST  HISTORY: Shortness of breath  COMPARISON: None.  FINDINGS:  Consolidation identified in the lateral left lung base. The lung fields are otherwise clear.  Heart size is normal. The pulmonary vasculature are nondilated. No pleural effusion or pneumothorax. Underlying spinal scoliotic curvature. Advanced degeneration at both glenohumeral joints. No acute bony abnormality.      Impression: 1. Dense consolidation identified in the lateral left lung base, concerning for a left-sided basilar pneumonia. Lung fields are otherwise clear. 2. Spinal scoliotic curvature with advanced degenerative change along the spine.  This report was finalized on 11/14/2020 11:54 by Dr Olayinka Asif, .     I have personally reviewed and interpreted the radiology studies and ECG obtained at time of admission.     Assessment / Plan      Assessment & Plan  Active Hospital Problems    Diagnosis   • **Paroxysmal atrial fibrillation with rapid ventricular response (CMS/HCC)   • Pneumonia due to COVID-19 virus   • Late onset Alzheimer's disease without behavioral disturbance (CMS/HCC)   • Comfort measures only status   • Type 2 diabetes mellitus (CMS/HCC)       PLAN:   Admit with comfort measures orders  Full discussion held with the patient's daughter    Code Status:   Comfort measures status, no CPR  Surrogate decision makers the patient's daughter     I discussed the patient's findings and my recommendations with: The patient's daughter    Estimated length of stay: Unknown    Electronically signed by Jonn Garcia DO, 11/14/20, 16:15 CST.

## 2020-11-14 NOTE — ED PROVIDER NOTES
Subjective   Patient was transferred to [place] for worsening condition she has been tested positive for Covid per the EMS the patient is a DNR but on her paperwork if she has that she is a full code we will try to figure that out right now the patient is tachycardic somewhat hypotensive decreased level of consciousness.  Cannot get a good history out of patient      History provided by:  EMS personnel and nursing home  History limited by:  Mental status change  Altered Mental Status  Presenting symptoms: confusion, disorientation, lethargy and partial responsiveness    Severity:  Moderate  Most recent episode:  Yesterday  Episode history:  Single  Timing:  Constant  Progression:  Worsening  Chronicity:  New  Context: recent illness and recent infection    Context: not alcohol use, not dementia, not head injury, not homeless, not nursing home resident and not recent change in medication    Associated symptoms: weakness    Associated symptoms: no abdominal pain, normal movement, no bladder incontinence and no decreased appetite        Review of Systems   Unable to perform ROS: Mental status change   Constitutional: Negative for decreased appetite.   Gastrointestinal: Negative for abdominal pain.   Genitourinary: Negative for bladder incontinence.   Neurological: Positive for weakness.   Psychiatric/Behavioral: Positive for confusion.       Past Medical History:   Diagnosis Date   • Atrial fibrillation (CMS/[facility])    • CHF (congestive heart failure) (CMS/[facility])    • Constipation    • CVA (cerebral vascular accident) (CMS/[facility])    • GERD (gastroesophageal reflux disease)    • Hypertension    • Hypothyroidism    • Type 2 diabetes mellitus (CMS/[facility])        Allergies   Allergen Reactions   • Ciprofloxacin Unknown - High Severity   • Pregabalin Unknown - Low Severity   • Vancomycin Unknown - High Severity       History reviewed. No pertinent surgical history.    History reviewed. No pertinent family history.    Social  History     Socioeconomic History   • Marital status:      Spouse name: Not on file   • Number of children: Not on file   • Years of education: Not on file   • Highest education level: Not on file   Substance and Sexual Activity   • Alcohol use: Not Currently           Objective   Physical Exam  Vitals signs and nursing note reviewed. Exam conducted with a chaperone present.   Constitutional:       General: She is in acute distress.      Appearance: She is toxic-appearing.   HENT:      Head: Normocephalic and atraumatic.   Eyes:      General: Lids are normal.      Conjunctiva/sclera: Conjunctivae normal.      Pupils: Pupils are equal, round, and reactive to light.   Neck:      Musculoskeletal: Full passive range of motion without pain, normal range of motion and neck supple.   Cardiovascular:      Rate and Rhythm: Tachycardia present. Rhythm irregularly irregular.      Chest Wall: PMI is not displaced.      Pulses: Decreased pulses.      Heart sounds: Murmur present. Systolic murmur present with a grade of 2/6.   Pulmonary:      Effort: Tachypnea and respiratory distress present.      Breath sounds: No stridor. Examination of the right-middle field reveals decreased breath sounds. Examination of the right-lower field reveals decreased breath sounds and rhonchi. Examination of the left-lower field reveals decreased breath sounds and rhonchi. Decreased breath sounds and rhonchi present.   Abdominal:      General: Abdomen is flat. Bowel sounds are decreased. There is no distension.      Palpations: Abdomen is soft.      Tenderness: There is no abdominal tenderness.      Comments: Nonspecific nonreproducible tenderness difficult examined the patient completely because she is she has altered mentation and does not respond appropriately   Musculoskeletal: Normal range of motion.      Comments: Extremity edema   Skin:     General: Skin is warm and dry.      Capillary Refill: Capillary refill takes more than 3  seconds.   Neurological:      General: No focal deficit present.      Mental Status: She is lethargic and confused.      GCS: GCS eye subscore is 3. GCS verbal subscore is 4. GCS motor subscore is 4.      Cranial Nerves: Cranial nerves are intact. No cranial nerve deficit.      Comments: Limited neuro exam patient is decreased level consciousness altered mental status.  Moving extremities spontaneously but very slowly not following commands         Procedures           ED Course  ED Course as of Nov 14 1535   Sat Nov 14, 2020   1205 EKG showed nonspecific ST-T wave changes rate 127 bpm irregularly irregular heartbeat    [TS]   1226 Patient has a history of chronic A. fib    [TS]   1357 Patient came in with confusion hypoxia altered level of consciousness lab work-up initially on the patient.  I have discussed this case at length with the patient's daughter Yodit on the phone the eventual plan is to take the patient to hospice the patient is a DNR but the family does not believe that the nursing home can take care of her.  Today in the ED she had received IV antibiotics IV steroids and supplemental oxygen along with Cardizem for her A. fib the patient will be admitted to the hospital for further evaluation.  I have discussed this case with the hospitalist    [TS]   1358 I have discussed getting a CT scan etc. also not pursue the CAT scan in this patient who requires treatment but no further aggressive evaluation.    [TS]   1411 Patient already is on anticoagulation    [TS]   1413 A. fib incomplete right bundle branch block nonspecific ST-T changes    [TS]   1433 Patient was tested as a Covid positive about 2 to 3 weeks ago I am not been able to get hold of the nursing home to do to mind exact date when the test was done she has been sick since then progressively getting worse today she has got a pneumonia on her chest x-ray and she is Covid negative she had received IV antibiotics but probably needs to go to Covid  floor with her symptomatology and positive findings I have tried to get hold of the infectious control but not been able to get through    [TS]   1534 Finally had a discussion with the nursing home supervisor this patient came for the patient was tested as positive at moderate on 6 November or maybe earlier on 6 November the nursing home got her since then she has been getting worse and today she was sent to the ER over here for evaluation after discussion with infectious control but we decided the proper decision will be to put the patient on Covid isolation    [TS]      ED Course User Index  [TS] Carlos Godfrey MD                                           MDM  Number of Diagnoses or Management Options  Diagnosis management comments: Differential Diagnosis:  I considered toxic-metabolic etiology, hypoglycemia, hyperglycemia, diabetic ketoacidosis, drug overdose, ethanol intoxication, thiamine deficiency, hypothermia, hyponatremia, hypernatremia, organ failure, liver failure, kidney failure, thyroid failure, adrenal failure, hypoxia, hypercarbia, ischemic stroke, intracranial bleed, subarachnoid hemorrhage, closed head injury, subdural hematoma, seizure activity, syncopal episode, infectious etiology, hypertensive encephalopathy, vasculitis, thrombotic thrombocytopenic purpura and disseminated intravascular coagulation as a possible cause of altered mental status in this patient. This is a partial list of diagnoses considered.              Amount and/or Complexity of Data Reviewed  Clinical lab tests: reviewed and ordered  Tests in the radiology section of CPT®: ordered  Tests in the medicine section of CPT®: ordered and reviewed    Risk of Complications, Morbidity, and/or Mortality  Presenting problems: moderate  Diagnostic procedures: high  Management options: high        Final diagnoses:   Pneumonia due to COVID-19 virus   Atrial fibrillation with RVR (CMS/HCC)   Acute UTI (urinary tract infection)   Hypoxic             Carlos Godfrey MD  11/14/20 1405       Carlos Godfrey MD  11/14/20 1412       Carlos Godfrey MD  11/14/20 1434       Carlos Godfrey MD  11/14/20 1535

## 2020-11-15 NOTE — PROGRESS NOTES
Continued Stay Note  ARIANE Ortiz     Patient Name: Trisha Talavera  MRN: [id number]  Today's Date: 11/15/2020    Admit Date: 11/14/2020    Discharge Plan     Row Name 11/15/20 1050       Plan    Plan Comments  SW received consult about wanting to discharge to [facility]. [facility] nor [facility] is accepting covid-19+ pts        Discharge Codes    No documentation.             Margarette Carney

## 2020-11-15 NOTE — PROGRESS NOTES
[facility] Medicine Services  INPATIENT PROGRESS NOTE    Length of Stay: 1  Date of Admission: 11/14/2020  Primary Care Physician: Raymond Juárez MD    Subjective     Chief Complaint:     Decreased level of consciousness, positive Covid study    HPI     The patient's level of consciousness remains altered.  She remains confused and hypoxic.  She was on comfort measures care with a morphine drip in progress.  She appears to be much more comfortable.  She is not anxious.    Review of Systems     All pertinent negatives and positives are as above. All other systems have been reviewed and are negative unless otherwise stated.     Objective    Temp:  [96.3 °F (35.7 °C)-99.3 °F (37.4 °C)] 97.4 °F (36.3 °C)  Heart Rate:  [103-126] 121  Resp:  [17-18] 18  BP: (120-139)/(63-89) 120/80    Lab Results (last 24 hours)     Procedure Component Value Units Date/Time    Urine Culture - Urine, Urine, Catheter [[id number]]  (Abnormal) Collected: 11/14/20 1239    Specimen: Urine, Catheter Updated: 11/15/20 1341     Urine Culture Yeast isolated     Comment: No further work up.       Blood Culture - Blood, Arm, Right [[id number]] Collected: 11/14/20 1142    Specimen: Blood from Arm, Right Updated: 11/15/20 1215     Blood Culture No growth at 24 hours    Blood Culture - Blood, Arm, Left [[id number]] Collected: 11/14/20 1149    Specimen: Blood from Arm, Left Updated: 11/15/20 1215     Blood Culture No growth at 24 hours    Lactic Acid, Reflex [[id number]]  (Normal) Collected: 11/14/20 1834    Specimen: Blood Updated: 11/14/20 1904     Lactate 2.0 mmol/L           Imaging Results (Last 24 Hours)     ** No results found for the last 24 hours. **             Intake/Output Summary (Last 24 hours) at 11/15/2020 1653  Last data filed at 11/15/2020 0119  Gross per 24 hour   Intake --   Output 650 ml   Net -650 ml       Physical Exam  Constitutional:       Appearance: Normal appearance. She is normal weight.    HENT:      Head: Normocephalic and atraumatic.      Right Ear: External ear normal.      Left Ear: External ear normal.      Nose: Nose normal.      Mouth/Throat:      Mouth: Mucous membranes are dry.      Pharynx: Oropharynx is clear.   Eyes:      General: No scleral icterus.     Conjunctiva/sclera: Conjunctivae normal.      Pupils: Pupils are equal, round, and reactive to light.   Neck:      Musculoskeletal: Normal range of motion and neck supple.   Cardiovascular:      Rate and Rhythm: Regular rhythm. Tachycardia present.      Heart sounds: Normal heart sounds.   Pulmonary:      Effort: No respiratory distress noted no accessory muscle usage.      Breath sounds: Examination of the left-lower field reveals rales.  Abdominal:      General: Abdomen is flat.      Palpations: There is no mass.      Tenderness: There is abdominal tenderness.   Musculoskeletal:         General: No swelling.      Right lower leg: No edema.      Left lower leg: No edema.   Skin:     General: Skin is warm and dry.   Neurological:      Mental Status: She is disoriented and unresponsive.   Psychiatric:         Cognition and Memory: Cognition is impaired.     Results Review:  I have reviewed the labs, radiology results, and diagnostic studies since my last progress note and made treatment changes reflective of the results.   I have reviewed the current medications.    Assessment/Plan     Active Hospital Problems    Diagnosis   • **Paroxysmal atrial fibrillation with rapid ventricular response (CMS/HCC)   • Pneumonia due to COVID-19 virus   • Late onset Alzheimer's disease without behavioral disturbance (CMS/HCC)   • Comfort measures only status   • Type 2 diabetes mellitus (CMS/HCC)       PLAN:  Continue comfort measures treatment  Palliative care consultation for assistance with disposition    Electronically signed by Jonn Garcia DO, 11/15/20, 16:53 CST.

## 2020-11-15 NOTE — PROGRESS NOTES
Discharge Planning Assessment   [place]     Patient Name: Trisha Talavera  MRN: [id number]  Today's Date: 11/15/2020    Admit Date: 11/14/2020    Discharge Needs Assessment     Row Name 11/15/20 1045       Living Environment    Lives With  facility resident    Current Living Arrangements  extended care facility    Primary Care Provided by  -- facility provides all that is needed    Provides Primary Care For  no one    Family Caregiver if Needed  -- facility provides all care    Quality of Family Relationships  helpful;involved;supportive    Able to Return to Prior Arrangements  yes       Resource/Environmental Concerns    Resource/Environmental Concerns  none    Transportation Concerns  car, none       Transition Planning    Patient/Family Anticipates Transition to  inpatient rehabilitation facility    Patient/Family Anticipated Services at Transition  none    Transportation Anticipated  health plan transportation       Discharge Needs Assessment    Readmission Within the Last 30 Days  no previous admission in last 30 days    Equipment Currently Used at Home  -- facility provides all that is needed    Concerns to be Addressed  no discharge needs identified;denies needs/concerns at this time    Anticipated Changes Related to Illness  none    Equipment Needed After Discharge  none    Discharge Coordination/Progress  Pt has RX coverage and a PCP. Pt is on comfort measures at this time but if need be pt is able to return to [facility]. SW will follow and assist with any other discharge needs that may arise.  Phone: [phone]         Fax: [phone]                Discharge Plan    No documentation.       Continued Care and Services - Admitted Since 11/14/2020     Destination Coordination complete    Service Provider Request Status Selected Services Address Phone Fax    [facility]   Selected Skilled Nursing [address], [facility] [address] [phone]                 Demographic Summary    No documentation.       Functional Status    No documentation.       Psychosocial    No documentation.       Abuse/Neglect    No documentation.       Legal    No documentation.       Substance Abuse    No documentation.       Patient Forms    No documentation.           Margarette Carney

## 2020-11-16 NOTE — PLAN OF CARE
Goal Outcome Evaluation:  Plan of Care Reviewed With: patient  Progress: no change  Outcome Summary: Pt has been unresponsive this shift.  Resting well with no s/s of discomfort or air hunger.  Morphine drip at 4mg/hr.  Q2 turn.  Incontinent of BMx1.  Barrier cream applied to excoriation/MASD on buttocks/tameka area.   HR a-fib 120s-140s.  Oral care performed.

## 2020-11-16 NOTE — PLAN OF CARE
Problem: Adult Inpatient Plan of Care  Goal: Plan of Care Review  Outcome: Ongoing, Progressing  Flowsheets (Taken 11/16/2020 1413)  Outcome Summary: RD nutrition screen completed.  Pt is currently for comfort measures and on a morphine drip.  Nothing further to add unless aggressive nutrition tx is desired.  WIll follow weekly

## 2020-11-16 NOTE — CONSULTS
Palliative Care Initial Consult   Attending Physician: Anand Lyle MD  Referring Provider: Jonn Garcia DO    Reason for Referral: comfort care/hospice referral discussion  Family/Support: no family at bedside due to COVID-19 restrictions     Code Status and Medical Interventions:   Ordered at: 20 1607     Level Of Support Discussed With:    Health Care Surrogate     Code Status:    No CPR     Medical Interventions (Level of Support Prior to Arrest):    Comfort Measures     Goals of Care: TBD.    HPI:   89 y.o. female with past medical history significant for Alzheimer's dementia, CVA, paroxysmal atrial fibrillation, hypertension, hypothyroidism, congestive heart failure, and type 2 diabetes mellitus.  Resident of [facility]. Patient presented to [facility] on 2020 related to decreased level of consciousness and positive Covid study at [facility].  She was also found to have atrial fibrillation with RVR.  Family elected no further aggressive care interventions but instead transition to comfort measures on admission.  As such a morphine drip was initiated.  Family requested transfer to [facility].  According to social work note 11/15, hospice house not excepting Covid positive patients.   Review of Systems   Unable to perform ROS: patient unresponsive       1- Pain Assessment  Nonverbal Indicators of Pain: nonverbal indicators absent  PAINAD Breathin-->normal  PAINAD Negative Vocalization: 0-->none  PAINAD Facial Expression: 0-->smiling or inexpressive  PAINAD Body Language: 0-->relaxed  PAINAD Consolability: 0-->no need to console  PAINAD Score: 0    Past Medical History:   Diagnosis Date   • Atrial fibrillation (CMS/[facility])    • CHF (congestive heart failure) (CMS/[facility])    • Constipation    • CVA (cerebral vascular accident) (CMS/[facility])    • GERD (gastroesophageal reflux disease)    • Hypertension    • Hypothyroidism    • Type 2  diabetes mellitus (CMS/HCC)      History reviewed. No pertinent surgical history.  Social History     Socioeconomic History   • Marital status:      Spouse name: Not on file   • Number of children: Not on file   • Years of education: Not on file   • Highest education level: Not on file   Tobacco Use   • Smoking status: Never Smoker   • Smokeless tobacco: Never Used   Substance and Sexual Activity   • Alcohol use: Not Currently   • Drug use: Never   • Sexual activity: Not Currently       Current Facility-Administered Medications   Medication Dose Route Frequency Provider Last Rate Last Dose   • acetaminophen (TYLENOL) tablet 650 mg  650 mg Oral Q4H PRN Jonn Garcia DO        Or   • acetaminophen (TYLENOL) 160 MG/5ML solution 650 mg  650 mg Oral Q4H PRN Jonn Garcia DO        Or   • acetaminophen (TYLENOL) suppository 650 mg  650 mg Rectal Q4H PRN Jonn Garcia DO       • diphenoxylate-atropine (LOMOTIL) 2.5-0.025 MG per tablet 1 tablet  1 tablet Oral Q2H PRN Jonn Garcia DO       • Glycerin-Hypromellose- (ARTIFICIAL TEARS) 0.2-0.2-1 % ophthalmic solution solution 1 drop  1 drop Both Eyes Q30 Min PRN Jonn Garcia DO       • LORazepam (ATIVAN) tablet 0.5 mg  0.5 mg Oral Q1H PRN Jonn Garcia DO        Or   • LORazepam (ATIVAN) injection 0.5 mg  0.5 mg Intravenous Q1H PRN Jonn Garcia DO        Or   • LORazepam (ATIVAN) injection 0.5 mg  0.5 mg Intramuscular Q1H PRN Jonn Garcia DO        Or   • LORazepam (ATIVAN) 2 MG/ML concentrated solution 0.5 mg  0.5 mg Oral Q1H PRN Jonn Garcia DO        Or   • LORazepam (ATIVAN) 2 MG/ML concentrated solution 0.5 mg  0.5 mg Sublingual Q1H PRN Jonn Garcia DO       • LORazepam (ATIVAN) tablet 1 mg  1 mg Oral Q1H PRN Jonn Garcia DO        Or   • LORazepam (ATIVAN) injection 1 mg  1 mg Intravenous Q1H PRN Jonn Garcia DO        Or   • LORazepam (ATIVAN) injection 1 mg  1 mg  Intramuscular Q1H PRN Jonn Garcia, DO        Or   • LORazepam (ATIVAN) 2 MG/ML concentrated solution 1 mg  1 mg Oral Q1H PRN Jonn Garcia DO        Or   • LORazepam (ATIVAN) 2 MG/ML concentrated solution 1 mg  1 mg Sublingual Q1H PRN Jonn Garcia, DO       • LORazepam (ATIVAN) tablet 2 mg  2 mg Oral Q1H PRN Jonn Garcia, DO        Or   • LORazepam (ATIVAN) injection 2 mg  2 mg Intravenous Q1H PRN Jonn Garcia DO        Or   • LORazepam (ATIVAN) injection 2 mg  2 mg Intramuscular Q1H PRN Jonn Garcia DO        Or   • LORazepam (ATIVAN) 2 MG/ML concentrated solution 2 mg  2 mg Oral Q1H PRN Jonn Garcia DO        Or   • LORazepam (ATIVAN) 2 MG/ML concentrated solution 2 mg  2 mg Sublingual Q1H PRN Jonn Garcia DO       • Morphine 1mg/ml infusion 30ml  2-30 mg/hr Intravenous Titrated Jonn Garcia DO 4 mL/hr at 11/16/20 0013 4 mg/hr at 11/16/20 0013   • sodium chloride 0.9 % flush 10 mL  10 mL Intravenous PRN Jonn Garcia DO         Morphine, 2-30 mg/hr, Last Rate: 4 mg/hr (11/16/20 0013)      •  acetaminophen **OR** acetaminophen **OR** acetaminophen  •  diphenoxylate-atropine  •  Glycerin-Hypromellose-  •  LORazepam **OR** LORazepam **OR** LORazepam **OR** LORazepam **OR** LORazepam  •  LORazepam **OR** LORazepam **OR** LORazepam **OR** LORazepam **OR** LORazepam  •  LORazepam **OR** LORazepam **OR** LORazepam **OR** LORazepam **OR** LORazepam  •  [COMPLETED] Insert peripheral IV **AND** sodium chloride    Allergies   Allergen Reactions   • Ciprofloxacin Unknown - High Severity   • Pregabalin Unknown - Low Severity   • Vancomycin Unknown - High Severity     Current medication reviewed for route, type, dose and frequency and are current per MAR at time of dictation.      Intake/Output Summary (Last 24 hours) at 11/16/2020 0945  Last data filed at 11/16/2020 0600  Gross per 24 hour   Intake 50.8 ml   Output 350 ml   Net -299.2 ml  "      Physical Exam:    Diagnostics: Reviewed  /66 (BP Location: Left arm, Patient Position: Lying)   Pulse (!) 131   Temp 98 °F (36.7 °C) (Axillary)   Resp 10   Ht 165.1 cm (65\")   Wt 81.3 kg (179 lb 4.8 oz)   SpO2 92%   BMI 29.84 kg/m²     Assessment completed via IPAD with assistance from nursing in an effort to preserve PPE and decrease exposure to COVID.  Vitals signs and nursing note reviewed.   Constitutional:       Appearance: Frail. Ill-appearing and acutely ill-appearing.      Interventions: Nasal cannula in place.   Eyes:      General: Lids are normal.   HENT:      Head: Normocephalic and atraumatic.   Neck:      Vascular: No JVD.   Pulmonary:      Effort: Accessory muscle usage present.      Comments: Agonal breath effort noted. Morphine drip appears to have depleted and nursing in process of restarting.   Cardiovascular:      Tachycardia present.      Comments: BLE edema L>R  Edema:     Peripheral edema present.  Musculoskeletal: Normal range of motion.   Skin:     Comments: [place] color to BLE   Genitourinary:     Comments: Roper catheter in place  Neurological:      Mental Status: Unresponsive.   Psychiatric:      Comments: dementia       Patient status: Disease state: Actively dying.  Functional status: Palliative Performance Scale Score: Performance 10% based on the following measures: Ambulation: Totally bed bound, Activity and Evidence of Disease: Unable to do any work, extensive evidence of disease, Self-Care: Total care required,  Intake: Mouth care only, LOC: Drowsy or comatose   Nutritional status: albumin 3.00. Body mass index is 29.84 kg/m².         Family support: The patient receives support from her children..  Advance Directives: Advance Directive Status: Patient does not have advance directive   POA/Healthcare surrogate-Yodit Little-daughter.    Impression/Problem List:    1.  Alzheimer's dementia  2.  Pneumonia due to COVID-19 virus  3.  Paroxysmal atrial fibrillation with " RVR  4.  History of CVA  5.  Hypertension  6.  Hypothyroidism  7.  Congestive heart failure  8.  Type 2 diabetes mellitus  9.  Advanced age    Recommendations/Plan:  1. plan: Goals of care include CODE STATUS no CPR/comfort measures per attending.    2.  Palliative care encounter  -transition to comfort care per attending at family request on admission.  -Anticipate patient to  in place.  -[facility] hospice houses not accepting COVID positive patients per social work note 11/15    3.  Comfort care  -continue morphine drip and titrate as needed for comfort. Received 288 mg oral morphine equivalent over the last 24 hours. Drip depleted upon assessment. Nursing has contacted pharmacy for repletion. Morphine 4 mg IV now as needed x 1. Discussed with nursing.  -Ativan 0.5 x 1 now. Discussed with nursing.  -add scheduled Ativan every 6 hours and as needed.  -Maintain richard catheter.  -scopolamine transdermal patch as needed excess secretions.    -Discussed with Dr. Lyle.    ADDENDUM-@[id number] spoke with Patient's daughter, Yodit via telephone. Updated on patient status and patient remains on morphine drip.  Plans to continue comfort care measures in place.  Daughter aware hospice house not accepting COVID positive patients.  Appreciative for call and update.      Thank you for this consult and allowing us to participate in patient's plan of care. Palliative Care Team will continue to follow patient.     Time spent: 70 minutes spent reviewing medical and medication records, assessing and examining patient, discussing with family, answering questions, providing some guidance about a plan and documentation of care, and coordinating care with other healthcare members, with > 50% time spent face to face.       Shannan Moncada, APRN  2020

## 2020-11-17 NOTE — DISCHARGE SUMMARY
[facility] Medicine Services  DEATH SUMMARY       Date of Admission: 11/14/2020  Date of Death:  11/16/2020 at approximately 1747  Primary Care Physician: Raymond Juárez MD    Presenting Problem/History of Present Illness:  Pneumonia due to COVID-19 virus [U07.1, J12.89]     Final Death Diagnoses:  Active Hospital Problems    Diagnosis   • **Paroxysmal atrial fibrillation with rapid ventricular response (CMS/HCC)   • Pneumonia due to COVID-19 virus   • Late onset Alzheimer's disease without behavioral disturbance (CMS/[facility])   • Comfort measures only status   • Type 2 diabetes mellitus (CMS/[facility])       Consults: Palliative Care    Procedures Performed: None    Pertinent Test Results:   Lab Results (last 72 hours)     Procedure Component Value Units Date/Time    Blood Culture - Blood, Arm, Right [[id number]] Collected: 11/14/20 1142    Specimen: Blood from Arm, Right Updated: 11/16/20 1215     Blood Culture No growth at 2 days    Blood Culture - Blood, Arm, Left [[id number]] Collected: 11/14/20 1149    Specimen: Blood from Arm, Left Updated: 11/16/20 1215     Blood Culture No growth at 2 days    Urine Culture - Urine, Urine, Catheter [[id number]]  (Abnormal) Collected: 11/14/20 1239    Specimen: Urine, Catheter Updated: 11/15/20 1341     Urine Culture Yeast isolated     Comment: No further work up.       Lactic Acid, Reflex [[id number]]  (Normal) Collected: 11/14/20 1834    Specimen: Blood Updated: 11/14/20 1904     Lactate 2.0 mmol/L     Lactic Acid, Reflex Timer (This will reflex a repeat order 3-3:15 hours after ordered.) [[id number]] Collected: 11/14/20 1149    Specimen: Blood Updated: 11/14/20 1545     Hold Tube Hold for add-ons.     Comment: Auto resulted.       COVID PRE-OP / PRE-PROCEDURE SCREENING ORDER (NO ISOLATION) - Swab, Nasal Cavity [[id number]]  (Normal) Collected: 11/14/20 1150    Specimen: Swab from Nasal Cavity Updated: 11/14/20 1313    Narrative:      The following  orders were created for panel order COVID PRE-OP / PRE-PROCEDURE SCREENING ORDER (NO ISOLATION) - Swab, Nasal Cavity.  Procedure                               Abnormality         Status                     ---------                               -----------         ------                     COVID-19,[employer] [facility]...[[id number]]  Normal              Final result                 Please view results for these tests on the individual orders.    COVID-19,[employer] IN-HOUSE,Nasal Swab No Transport Media 3-4 HR TAT - Swab, Nasal Cavity [[id number]]  (Normal) Collected: 11/14/20 1150    Specimen: Swab from Nasal Cavity Updated: 11/14/20 1313     COVID19 Not Detected    Narrative:      Fact sheet for providers: https://www.fda.gov/media/436801/download     Fact sheet for patients: https://www.fda.gov/media/565556/download  Fact sheet for providers: https://www.fda.gov/media/584294/download     Fact sheet for patients: https://www.fda.gov/media/049020/download    Urinalysis, Microscopic Only - Urine, Catheter [[id number]]  (Abnormal) Collected: 11/14/20 1239    Specimen: Urine, Catheter Updated: 11/14/20 1308     RBC, UA 3-5 /HPF      WBC, UA 13-20 /HPF      Bacteria, UA Trace /HPF      Squamous Epithelial Cells, UA 0-2 /HPF      Yeast, UA Large/3+ Budding Yeast /HPF      Hyaline Casts, UA None Seen /LPF      Methodology Manual Light Microscopy    Urinalysis With Culture If Indicated - Urine, Catheter [[id number]]  (Abnormal) Collected: 11/14/20 1239    Specimen: Urine, Catheter Updated: 11/14/20 1303     Color, UA Yellow     Appearance, UA Cloudy     pH, UA 6.0     Specific Gravity, UA 1.020     Glucose, UA >=1000 mg/dL (3+)     Ketones, UA 15 mg/dL (1+)     Bilirubin, UA Negative     Blood, UA Small (1+)     Protein,  mg/dL (2+)     Leuk Esterase, UA Small (1+)     Nitrite, UA Negative     Urobilinogen, UA 0.2 E.U./dL    Comprehensive Metabolic Panel [[id number]]  (Abnormal) Collected: 11/14/20 1149    Specimen: Blood  Updated: 11/14/20 1236     Glucose 424 mg/dL      BUN 57 mg/dL      Creatinine 1.60 mg/dL      Sodium 150 mmol/L      Potassium 3.1 mmol/L      Chloride 104 mmol/L      CO2 31.0 mmol/L      Calcium 9.1 mg/dL      Total Protein 6.2 g/dL      Albumin 3.00 g/dL      ALT (SGPT) 7 U/L      AST (SGOT) 17 U/L      Alkaline Phosphatase 162 U/L      Total Bilirubin 0.3 mg/dL      eGFR Non African Amer 30 mL/min/1.73      Globulin 3.2 gm/dL      A/G Ratio 0.9 g/dL      BUN/Creatinine Ratio 35.6     Anion Gap 15.0 mmol/L     Narrative:      GFR Normal >60  Chronic Kidney Disease <60  Kidney Failure <15      Troponin [[id number]]  (Abnormal) Collected: 11/14/20 1149    Specimen: Blood Updated: 11/14/20 1235     Troponin T 0.045 ng/mL     Narrative:      Troponin T Reference Range:  <= 0.03 ng/mL-   Negative for AMI  >0.03 ng/mL-     Abnormal for myocardial necrosis.  Clinicians would have to utilize clinical acumen, EKG, Troponin and serial changes to determine if it is an Acute Myocardial Infarction or myocardial injury due to an underlying chronic condition.       Results may be falsely decreased if patient taking Biotin.      Lactic Acid, Plasma [[id number]]  (Abnormal) Collected: 11/14/20 1149    Specimen: Blood Updated: 11/14/20 1235     Lactate 2.1 mmol/L     C-reactive Protein [[id number]]  (Abnormal) Collected: 11/14/20 1149    Specimen: Blood Updated: 11/14/20 1230     C-Reactive Protein 7.01 mg/dL     CBC & Differential [[id number]]  (Abnormal) Collected: 11/14/20 1149    Specimen: Blood Updated: 11/14/20 1229    Narrative:      The following orders were created for panel order CBC & Differential.  Procedure                               Abnormality         Status                     ---------                               -----------         ------                     CBC Auto Differential[[id number]]        Abnormal            Final result                 Please view results for these tests on the individual orders.     "Procalcitonin [[id number]]  (Abnormal) Collected: 11/14/20 1149    Specimen: Blood Updated: 11/14/20 1229     Procalcitonin 0.81 ng/mL     Narrative:      As a Marker for Sepsis (Non-Neonates):   1. <0.5 ng/mL represents a low risk of severe sepsis and/or septic shock.  1. >2 ng/mL represents a high risk of severe sepsis and/or septic shock.    As a Marker for Lower Respiratory Tract Infections that require antibiotic therapy:  PCT on Admission     Antibiotic Therapy             6-12 Hrs later  > 0.5                Strongly Recommended            >0.25 - <0.5         Recommended  0.1 - 0.25           Discouraged                   Remeasure/reassess PCT  <0.1                 Strongly Discouraged          Remeasure/reassess PCT      As 28 day mortality risk marker: \"Change in Procalcitonin Result\" (> 80 % or <=80 %) if Day 0 (or Day 1) and Day 4 values are available. Refer to http://www.[employer]-calculator.com/   Change in PCT <=80 %   A decrease of PCT levels below or equal to 80 % defines a positive change in PCT test result representing a higher risk for 28-day all-cause mortality of patients diagnosed with severe sepsis or septic shock.  Change in PCT > 80 %   A decrease of PCT levels of more than 80 % defines a negative change in PCT result representing a lower risk for 28-day all-cause mortality of patients diagnosed with severe sepsis or septic shock.                Results may be falsely decreased if patient taking Biotin.     CBC Auto Differential [[id number]]  (Abnormal) Collected: 11/14/20 1149    Specimen: Blood Updated: 11/14/20 1229     WBC 8.31 10*3/mm3      RBC 5.72 10*6/mm3      Hemoglobin 12.8 g/dL      Hematocrit 42.7 %      MCV 74.7 fL      MCH 22.4 pg      MCHC 30.0 g/dL      RDW 22.8 %      RDW-SD 56.5 fl      MPV 11.2 fL      Platelets 291 10*3/mm3      Neutrophil % 68.5 %      Lymphocyte % 16.7 %      Monocyte % 12.6 %      Eosinophil % 1.0 %      Basophil % 0.6 %      Neutrophils, Absolute " 5.69 10*3/mm3      Lymphocytes, Absolute 1.39 10*3/mm3      Monocytes, Absolute 1.05 10*3/mm3      Eosinophils, Absolute 0.08 10*3/mm3      Basophils, Absolute 0.05 10*3/mm3     Scan Slide [[id number]] Collected: 11/14/20 1149    Specimen: Blood Updated: 11/14/20 1229     Anisocytosis Mod/2+     Elliptocytes Slight/1+     Microcytes Slight/1+     Poikilocytes Slight/1+     Platelet Estimate Adequate    BNP [[id number]]  (Abnormal) Collected: 11/14/20 1149    Specimen: Blood Updated: 11/14/20 1222     proBNP 4,103.0 pg/mL     Narrative:      Among patients with dyspnea, NT-proBNP is highly sensitive for the detection of acute congestive heart failure. In addition NT-proBNP of <300 pg/ml effectively rules out acute congestive heart failure with 99% negative predictive value.    Results may be falsely decreased if patient taking Biotin.      Protime-INR [[id number]]  (Abnormal) Collected: 11/14/20 1149    Specimen: Blood Updated: 11/14/20 1215     Protime 14.6 Seconds      INR 1.18    aPTT [[id number]]  (Normal) Collected: 11/14/20 1149    Specimen: Blood Updated: 11/14/20 1215     PTT 24.7 seconds     D-dimer, Quantitative [[id number]]  (Abnormal) Collected: 11/14/20 1149    Specimen: Blood Updated: 11/14/20 1215     D-Dimer, Quantitative 2.47 mg/L (FEU)     Narrative:      Reference Range is 0-0.50 mg/L FEU. However, results <0.50 mg/L FEU tends to rule out DVT or PE. Results >0.50 mg/L FEU are not useful in predicting absence or presence of DVT or PE.      Blood Gas, Arterial - [[id number]]  (Abnormal) Collected: 11/14/20 1152    Specimen: Arterial Blood Updated: 11/14/20 1156     Site Right Radial     Ren's Test Positive     pH, Arterial 7.475 pH units      Comment: 83 Value above reference range        pCO2, Arterial 48.4 mm Hg      Comment: 83 Value above reference range        pO2, Arterial 61.3 mm Hg      Comment: 84 Value below reference range        HCO3, Arterial 35.7 mmol/L      Comment: 83 Value above  "reference range        Base Excess, Arterial 10.5 mmol/L      Comment: 83 Value above reference range        O2 Saturation, Arterial 92.4 %      Comment: 84 Value below reference range        Temperature 37.0 C      Barometric Pressure for Blood Gas 750 mmHg      Modality Room Air     Ventilator Mode NA     Collected by [id number]     Comment: Meter: [id number]     :  [id number]        pCO2, Temperature Corrected 48.4 mm Hg      pH, Temp Corrected 7.475 pH Units      pO2, Temperature Corrected 61.3 mm Hg         Imaging Results (Last 72 Hours)     Procedure Component Value Units Date/Time    XR Chest 1 View [[id number]] Collected: 11/14/20 1153     Updated: 11/14/20 1157    Narrative:      Frontal upright radiograph of the chest 11/14/2020 11:35 AM CST     HISTORY: Shortness of breath     COMPARISON: None.     FINDINGS:      Consolidation identified in the lateral left lung base. The lung fields  are otherwise clear. Heart size is normal. The pulmonary vasculature are  nondilated. No pleural effusion or pneumothorax. Underlying spinal  scoliotic curvature. Advanced degeneration at both glenohumeral joints.  No acute bony abnormality.       Impression:      1. Dense consolidation identified in the lateral left lung base,  concerning for a left-sided basilar pneumonia. Lung fields are otherwise  clear.  2. Spinal scoliotic curvature with advanced degenerative change along  the spine.     This report was finalized on 11/14/2020 11:54 by Dr Olayinka Asif, .        COVID19   Date Value Ref Range Status   11/14/2020 Not Detected Not Detected - Ref. Range Final       Hospital Course:  The patient is a 89 y.o. female who presented to [facility] with \"Decreased level of consciousness, positive Covid study at [facility]\".  Per HPI completed by Dr. Garcia, \"This 89-year-old white female is admitted after being transferred from [facility] with confusion, hypoxia, altered level of consciousness.  The " "patient was found to be Covid positive at [facility].  She was evaluated in the emergency department and received IV antibiotics and IV steroids as well as supplemental oxygen and Cardizem for A. fib with RVR.  Discussion was held with the patient's daughter about the patient's overall condition.  The patient was DNR/DNI at the nursing facility and the patient's daughter stated that she wanted her mother to be comfortable.  After further discussion patient's daughter felt that continuation of aggressive treatment would be counterproductive at this point.  She would like to transition to comfort measures care.     Work-up in the ED shows urinalysis with 3-5 RBCs and 13-20 WBCs per high-powered field with 3+ budding yeast.  Blood gases show respiratory alkalosis with PCO2 48.5 and PO2 61.3.  CMP shows blood sugar of 424, creatinine 1.6, sodium 150, potassium 3.1.  proBNP elevated at 4103, D-dimer 2.47, troponin 0 0.045, lactate 2.1.  Chest x-ray showed a dense consolidation in the left lateral lung base concerning for left-sided basilar pneumonia.\"    Case was discussed with palliative care and adjustments were made to the morphine drip.    Patient was placed on comfort measures and treated with comfort medications including morphine drip, ativan.  Symptoms were managed well and patient passed away peacefully.        Electronically signed by Anand Lyle MD, 11/16/20, 18:02 CST.    Time: 35 minutes    "

## 2020-11-19 LAB
BACTERIA SPEC AEROBE CULT: NORMAL
BACTERIA SPEC AEROBE CULT: NORMAL